# Patient Record
Sex: MALE | Race: WHITE | NOT HISPANIC OR LATINO | Employment: FULL TIME | URBAN - METROPOLITAN AREA
[De-identification: names, ages, dates, MRNs, and addresses within clinical notes are randomized per-mention and may not be internally consistent; named-entity substitution may affect disease eponyms.]

---

## 2024-02-01 ASSESSMENT — SLEEP AND FATIGUE QUESTIONNAIRES
HOW LIKELY ARE YOU TO NOD OFF OR FALL ASLEEP WHILE SITTING QUIETLY AFTER LUNCH WITHOUT ALCOHOL: WOULD NEVER DOZE
HOW LIKELY ARE YOU TO NOD OFF OR FALL ASLEEP WHILE SITTING AND TALKING TO SOMEONE: WOULD NEVER DOZE
HOW LIKELY ARE YOU TO NOD OFF OR FALL ASLEEP WHEN YOU ARE A PASSENGER IN A CAR FOR AN HOUR WITHOUT A BREAK: HIGH CHANCE OF DOZING
HOW LIKELY ARE YOU TO NOD OFF OR FALL ASLEEP WHILE WATCHING TV: SLIGHT CHANCE OF DOZING
HOW LIKELY ARE YOU TO NOD OFF OR FALL ASLEEP WHILE SITTING AND READING: MODERATE CHANCE OF DOZING
HOW LIKELY ARE YOU TO NOD OFF OR FALL ASLEEP WHILE SITTING INACTIVE IN A PUBLIC PLACE: SLIGHT CHANCE OF DOZING
HOW LIKELY ARE YOU TO NOD OFF OR FALL ASLEEP WHILE LYING DOWN TO REST IN THE AFTERNOON WHEN CIRCUMSTANCES PERMIT: HIGH CHANCE OF DOZING
HOW LIKELY ARE YOU TO NOD OFF OR FALL ASLEEP IN A CAR, WHILE STOPPED FOR A FEW MINUTES IN TRAFFIC: WOULD NEVER DOZE

## 2024-02-01 NOTE — PROGRESS NOTES
"    New Medical Weight Management Consult    PATIENT:  Isaias Valencia Jr.  MRN:         [id number]  :         1985  TERESA:         2024    Dear angel,    I had the pleasure of seeing your patient, Isaias Valencia Jr.. Full intake/assessment was done to determine barriers to weight loss success and develop a treatment plan. Isaias Valencia Jr. is a 38 year old male interested in treatment of medical problems associated with excess weight. He has a height of 5' 7.75\", a weight of 307 lbs 14.4 oz, and the calculated Body mass index is 47.16 kg/m .    ASSESSMENT/PLAN:  Class 3 severe obesity due to excess calories without serious comorbidity with body mass index (BMI) of 45.0 to 49.9 in adult (H)  38 year old year old male in clinic today to discuss treatment of the following conditions through diet and lifestyle modification and weight loss:  1. Class 3 severe obesity due to excess calories without serious comorbidity with body mass index (BMI) of 45.0 to 49.9 in adult (H)    2. Dyslipidemia    3. Depression with anxiety    4. Elevated fasting glucose      Intake: 38-year-old man with severe obesity who meets criteria for metabolic syndrome presenting for discussion of overall metabolic health.  We do lengthy discussion regarding metabolic syndrome and how this predisposes him to fat and easily.  He has a job that [place] which is a barrier to successful weight loss.  He is busy both professionally and with his family.  I recommended a high-protein and high vegetable diet.  He asked a lot of questions about fasting/intermittent fasting.  I do not think this is unreasonable but I did caution him to ensure that he is making/consuming adequate protein.  There is no contraindication to a GLP-1 receptor agonist.  We discussed potential benefits and drawbacks of various options.  Zepbound was prescribed.  No family/personal history of thyroid cancer.  No " "personal history of pancreatitis.  He does not have symptoms suggestive of binge eating disorder.  He has a history of anxiety making anorexia genic medication such as phentermine and phentermine/topiramate (or diethylpropion) relatively contraindicated.  He has been Wellbutrin for mental health in the past without any benefit either for appetite or for mood.  He is working with a dietitian and a different health system.  He will continue to do so.  Of asked him to return to clinic approximately 8-10 weeks after starting a new medication.  Will titrate tirzepatide to at least 10 mg and likely 15 mg.    FOLLOW-UP:   8 weeks.    SUBJECTIVE:     He has the following co-morbidities:        2/1/2024     8:50 PM   --   I have the following health issues associated with obesity None of the above   I have the following symptoms associated with obesity Depression    Back Pain    Fatigue     Narrative History:    - in the past he lost weight through counting calories.   - frustrated that he has not lost jennifer in the past 30 days.  Lots of water.  <2000 calories.     - energy: \"not great.\"    - sleep: varies.  More on the weekends. Less during the week.     - kids play hockey.   - work is busy.  Does not eat during the day.  [place]  As a result he struggles with nutrition.  Some overnight travel.     - he did not grow up eating veggies. He has added peas and carrots.      - no alcohol.   - some flavored water.    - history of anxiety. He was on a number of medications including wellbutrin.  Some persistant depression and anxiety symptoms.  - goal: hunting, fishing and kayaking.          No data to display                  2/1/2024     8:50 PM   Referring Provider   Please name the provider who referred you to Medical Weight Management  If you do not know, please answer \"I Don't Know\" No one         2/1/2024     8:50 PM   Weight History   How concerned are you about your weight? " Very Concerned   I became overweight As a Teenager   The following factors have contributed to my weight gain Mental Health Issues    Started on Medication that Caused Weight Gain    Eating Too Much    Lack of Exercise    Stress   I have tried the following methods to lose weight Watching Portions or Calories    Exercise    Atkins-type Diet (Low Carb/High Protein)    Fasting   My lowest weight since age 16 was 200   My highest weight since age 16 was 310   The most weight I have ever lost was (lbs) 65   I have the following family history of obesity/being overweight My mother is overweight    One or more of my siblings are overweight    Many of my relatives are overweight   How has your weight changed over the last year? Lost   How many pounds? 1         2/1/2024     8:50 PM   Diet Recall Review with Patient   If you do eat lunch, what types of food do you typically eat? Whatever kwik trip has   If you do eat supper, what types of food do you typically eat? Meat vegtable and potato   If you do snack, what types of food do you typically eat? Chips   How many glasses of juice do you drink in a typical day? 0   How many of glasses of milk do you drink in a typical day? 1   If you do drink milk, what type? 2%   How many 8oz glasses of sugar containing drinks such as Ck-Aid/sweet tea do you drink in a day? 0   How many cans/bottles of sugar pop/soda/tea/sports drinks do you drink in a day? 0   How many cans/bottles of diet pop/soda/tea or sports drink do you drink in a day? 3   How often do you have a drink of alcohol? Never         2/1/2024     8:50 PM   Eating Habits   Generally, my meals include foods like these bread, pasta, rice, potatoes, corn, crackers, sweet dessert, pop, or juice Almost Everyday   Generally, my meals include foods like these fried meats, brats, burgers, french fries, pizza, cheese, chips, or ice cream A Few Times a Week   Eat fast food (like [employer], [employer], [employer]) A Few Times a  Week   Eat at a buffet or sit-down restaurant Less Than Weekly   Eat most of my meals in front of the TV or computer A Few Times a Week   Often skip meals, eat at random times, have no regular eating times Almost Everyday   Rarely sit down for a meal but snack or graze throughout A Few Times a Week   Eat extra snacks between meals A Few Times a Week   Eat most of my food at the end of the day Almost Everyday   Eat in the middle of the night or wake up at night to eat A Few Times a Week   Eat extra snacks to prevent or correct low blood sugar Never   Eat to prevent acid reflux or stomach pain Never   Worry about not having enough food to eat Never   I eat when I am depressed A Few Times a Week   I eat when I am stressed A Few Times a Week   I eat when I am bored A Few Times a Week   I eat when I am anxious A Few Times a Week   I eat when I am happy or as a reward Less Than Weekly   I feel hungry all the time even if I just have eaten Everyday   Feeling full is important to me Almost Everyday   I finish all the food on my plate even if I am already full Almost Everyday   I can't resist eating delicious food or walk past the good food/smell Almost Everyday   I eat/snack without noticing that I am eating Almost Everyday   I eat when I am preparing the meal A Few Times a Week   I eat more than usual when I see others eating A Few Times a Week   I have trouble not eating sweets, ice cream, cookies, or chips if they are around the house Everyday   I think about food all day Everyday   What foods, if any, do you crave? Chips/Crackers   Please list any other foods you crave? Salty foods abd greasy foods         2/1/2024     8:50 PM   Amount of Food   I feel out of control when eating Almost Everyday   I eat a large amount of food, like a loaf of bread, a box of cookies, a pint/quart of ice cream, all at once Weekly   I eat a large amount of food even when I am not hungry Weekly   I eat rapidly Everyday   I eat alone because  I feel embarrassed and do not want others to see how much I have eaten Almost Everyday   I eat until I am uncomfortably full Weekly   I feel bad, disgusted, or guilty after I overeat Everyday         2/1/2024     8:50 PM   Activity/Exercise History   How much of a typical 12 hour day do you spend sitting? Half the Day   How much of a typical 12 hour day do you spend lying down? Less Than Half the Day   How much of a typical day do you spend walking/standing? Half the Day   How many hours (not including work) do you spend on the TV/Video Games/Computer/Tablet/Phone? 2-3 Hours   How many times a week are you active for the purpose of exercise? Once a Week   What keeps you from being more active? Too tired   How many total minutes do you spend doing some activity for the purpose of exercising when you exercise? 15-30 Minutes     PAST MEDICAL HISTORY:  Past Medical History:   Diagnosis Date    Depressive disorder          2/1/2024     8:50 PM   Work/Social History Reviewed With Patient   My employment status is Full-Time   My job is Service tech   How much of your job is spent on the computer or phone? Less Than 50%   How many hours do you spend commuting to work daily? 4   What is your marital status? Single   If you have children, are they overweight? Yes   Who do you live with? Myself and kids   Who does the food shopping? Me         2/1/2024     8:50 PM   Mental Health History Reviewed With Patient   Have you ever been physically or sexually abused? No   How often in the past 2 weeks have you felt little interest or pleasure in doing things? For Several Days   Over the past 2 weeks how often have you felt down, depressed, or hopeless? For Several Days         2/1/2024     8:50 PM   Sleep History Reviewed With Patient   How many hours do you sleep at night? 7     MEDICATIONS:   Current Outpatient Medications   Medication Sig Dispense Refill    tirzepatide-Weight Management (ZEPBOUND) 2.5 MG/0.5ML prefilled pen Inject  "0.5 mLs (2.5 mg) Subcutaneous every 7 days (Send message to clinic after first 2 doses.  We will send the \"next step\" in the titration schedule.) 2 mL 0     ALLERGIES:   No Known Allergies  PHYSICAL EXAM:  Physical Exam  Nursing note reviewed.   Constitutional:       General: He is not in acute distress.     Appearance: Normal appearance. He is not ill-appearing.   HENT:      Head: Normocephalic and atraumatic.   Eyes:      Extraocular Movements: Extraocular movements intact.      Conjunctiva/sclera: Conjunctivae normal.   Pulmonary:      Effort: Pulmonary effort is normal.   Neurological:      Mental Status: He is alert and oriented to person, place, and time.   Psychiatric:         Attention and Perception: Attention normal.         Mood and Affect: Mood normal.         Speech: Speech normal.         Thought Content: Thought content normal.       63 minutes spent on the date of the encounter doing chart review, history and exam, documentation and further activities per the note    This note has been dictated using voice recognition software. Any grammatical or context distortions are unintentional and inherent to the software    Sincerely,    Mateus Llamas MD  Diplomate - American Board of Obesity Medicine  Diplomate - American Board of Family Medicine  [facility] - Comprehensive Weight Management      "

## 2024-02-02 ENCOUNTER — OFFICE VISIT (OUTPATIENT)
Dept: FAMILY MEDICINE | Facility: CLINIC | Age: 39
End: 2024-02-02

## 2024-02-02 VITALS
SYSTOLIC BLOOD PRESSURE: 121 MMHG | RESPIRATION RATE: 16 BRPM | DIASTOLIC BLOOD PRESSURE: 82 MMHG | TEMPERATURE: 98.3 F | HEART RATE: 64 BPM | WEIGHT: 307.9 LBS | OXYGEN SATURATION: 97 % | HEIGHT: 68 IN | BODY MASS INDEX: 46.66 KG/M2

## 2024-02-02 DIAGNOSIS — E66.813 CLASS 3 SEVERE OBESITY DUE TO EXCESS CALORIES WITHOUT SERIOUS COMORBIDITY WITH BODY MASS INDEX (BMI) OF 45.0 TO 49.9 IN ADULT (H): Primary | ICD-10-CM

## 2024-02-02 DIAGNOSIS — F41.8 DEPRESSION WITH ANXIETY: ICD-10-CM

## 2024-02-02 DIAGNOSIS — E66.01 CLASS 3 SEVERE OBESITY DUE TO EXCESS CALORIES WITHOUT SERIOUS COMORBIDITY WITH BODY MASS INDEX (BMI) OF 45.0 TO 49.9 IN ADULT (H): Primary | ICD-10-CM

## 2024-02-02 DIAGNOSIS — E78.5 DYSLIPIDEMIA: ICD-10-CM

## 2024-02-02 DIAGNOSIS — R73.01 ELEVATED FASTING GLUCOSE: ICD-10-CM

## 2024-02-02 PROCEDURE — 99205 OFFICE O/P NEW HI 60 MIN: CPT | Performed by: FAMILY MEDICINE

## 2024-02-02 NOTE — ASSESSMENT & PLAN NOTE
38 year old year old male in clinic today to discuss treatment of the following conditions through diet and lifestyle modification and weight loss:  1. Class 3 severe obesity due to excess calories without serious comorbidity with body mass index (BMI) of 45.0 to 49.9 in adult (H)    2. Dyslipidemia    3. Depression with anxiety    4. Elevated fasting glucose      Intake: 38-year-old man with severe obesity who meets criteria for metabolic syndrome presenting for discussion of overall metabolic health.  We do lengthy discussion regarding metabolic syndrome and how this predisposes him to fat and easily.  He has a job that [place] which is a barrier to successful weight loss.  He is busy both professionally and with his family.  I recommended a high-protein and high vegetable diet.  He asked a lot of questions about fasting/intermittent fasting.  I do not think this is unreasonable but I did caution him to ensure that he is making/consuming adequate protein.  There is no contraindication to a GLP-1 receptor agonist.  We discussed potential benefits and drawbacks of various options.  Zepbound was prescribed.  No family/personal history of thyroid cancer.  No personal history of pancreatitis.  He does not have symptoms suggestive of binge eating disorder.  He has a history of anxiety making anorexia genic medication such as phentermine and phentermine/topiramate (or diethylpropion) relatively contraindicated.  He has been Wellbutrin for mental health in the past without any benefit either for appetite or for mood.  He is working with a dietitian and a different health system.  He will continue to do so.  Of asked him to return to clinic approximately 8-10 weeks after starting a new medication.  Will titrate tirzepatide to at least 10 mg and likely 15 mg.

## 2024-02-13 ENCOUNTER — MYC MEDICAL ADVICE (OUTPATIENT)
Dept: FAMILY MEDICINE | Facility: CLINIC | Age: 39
End: 2024-02-13

## 2024-02-13 DIAGNOSIS — E66.01 CLASS 3 SEVERE OBESITY DUE TO EXCESS CALORIES WITHOUT SERIOUS COMORBIDITY WITH BODY MASS INDEX (BMI) OF 45.0 TO 49.9 IN ADULT (H): Primary | ICD-10-CM

## 2024-02-13 DIAGNOSIS — R73.01 ELEVATED FASTING GLUCOSE: ICD-10-CM

## 2024-02-13 DIAGNOSIS — E66.813 CLASS 3 SEVERE OBESITY DUE TO EXCESS CALORIES WITHOUT SERIOUS COMORBIDITY WITH BODY MASS INDEX (BMI) OF 45.0 TO 49.9 IN ADULT (H): Primary | ICD-10-CM

## 2024-02-13 DIAGNOSIS — E78.5 DYSLIPIDEMIA: ICD-10-CM

## 2024-03-03 ENCOUNTER — HEALTH MAINTENANCE LETTER (OUTPATIENT)
Age: 39
End: 2024-03-03

## 2024-03-22 ENCOUNTER — MYC MEDICAL ADVICE (OUTPATIENT)
Dept: FAMILY MEDICINE | Facility: CLINIC | Age: 39
End: 2024-03-22

## 2024-03-22 DIAGNOSIS — E66.01 CLASS 3 SEVERE OBESITY DUE TO EXCESS CALORIES WITHOUT SERIOUS COMORBIDITY WITH BODY MASS INDEX (BMI) OF 45.0 TO 49.9 IN ADULT (H): Primary | ICD-10-CM

## 2024-03-22 DIAGNOSIS — E78.5 DYSLIPIDEMIA: ICD-10-CM

## 2024-03-22 DIAGNOSIS — E66.813 CLASS 3 SEVERE OBESITY DUE TO EXCESS CALORIES WITHOUT SERIOUS COMORBIDITY WITH BODY MASS INDEX (BMI) OF 45.0 TO 49.9 IN ADULT (H): Primary | ICD-10-CM

## 2024-03-22 DIAGNOSIS — R73.01 ELEVATED FASTING GLUCOSE: ICD-10-CM

## 2024-04-02 ENCOUNTER — OFFICE VISIT (OUTPATIENT)
Dept: FAMILY MEDICINE | Facility: CLINIC | Age: 39
End: 2024-04-02
Attending: FAMILY MEDICINE
Payer: COMMERCIAL

## 2024-04-02 VITALS
HEIGHT: 68 IN | DIASTOLIC BLOOD PRESSURE: 84 MMHG | OXYGEN SATURATION: 97 % | TEMPERATURE: 97.8 F | RESPIRATION RATE: 16 BRPM | HEART RATE: 74 BPM | WEIGHT: 275.3 LBS | SYSTOLIC BLOOD PRESSURE: 108 MMHG | BODY MASS INDEX: 41.72 KG/M2

## 2024-04-02 DIAGNOSIS — E66.01 CLASS 3 SEVERE OBESITY DUE TO EXCESS CALORIES WITHOUT SERIOUS COMORBIDITY WITH BODY MASS INDEX (BMI) OF 40.0 TO 44.9 IN ADULT (H): ICD-10-CM

## 2024-04-02 DIAGNOSIS — R73.01 ELEVATED FASTING GLUCOSE: ICD-10-CM

## 2024-04-02 DIAGNOSIS — E66.813 CLASS 3 SEVERE OBESITY DUE TO EXCESS CALORIES WITHOUT SERIOUS COMORBIDITY WITH BODY MASS INDEX (BMI) OF 40.0 TO 44.9 IN ADULT (H): ICD-10-CM

## 2024-04-02 DIAGNOSIS — E78.5 DYSLIPIDEMIA: ICD-10-CM

## 2024-04-02 PROCEDURE — 99213 OFFICE O/P EST LOW 20 MIN: CPT | Performed by: FAMILY MEDICINE

## 2024-04-02 NOTE — PROGRESS NOTES
"  Assessment & Plan   Problem List Items Addressed This Visit       Class 3 severe obesity due to excess calories without serious comorbidity with body mass index (BMI) of 40.0 to 44.9 in adult (H)     38 year old year old male in clinic today to discuss treatment of the following conditions through diet and lifestyle modification and weight loss:  1. Class 3 severe obesity due to excess calories without serious comorbidity with body mass index (BMI) of 40.0 to 44.9 in adult (H)    2. Dyslipidemia    3. Elevated fasting glucose    The patient's weight loss result since the last visit was successful based on weight loss.  Cravings greatly reduced.  Doing well.  Discussed movement.   His insurance will change and he will have a copay increase.  He will need a PA.  The patient was told that he will get approved.      BMI: Body mass index is 42.17 kg/m .  Ideal body weight: 67.8 kg (149 lb 8.4 oz)  Adjusted ideal body weight: 90.6 kg (199 lb 13.4 oz)    Current therapies:    Medications: YES - Zepbound   - Starting weight for GLP1 receptor agonist: 307 lb   - Total weight loss: 32 lbs (10.4%)    Nutritional goals/strategies: reviewed.   - caloric restriction: Yes   - time restriction: NO   - diet (macronutrient) framework: high protein/low carbohydrate    Movement:   - predominantly cardiovascular    Follow-up: 3 months           Relevant Medications    tirzepatide-Weight Management (ZEPBOUND) 12.5 MG/0.5ML prefilled pen    Dyslipidemia    Relevant Medications    tirzepatide-Weight Management (ZEPBOUND) 12.5 MG/0.5ML prefilled pen     Other Visit Diagnoses       Elevated fasting glucose        Relevant Medications    tirzepatide-Weight Management (ZEPBOUND) 12.5 MG/0.5ML prefilled pen             BMI  Estimated body mass index is 42.17 kg/m  as calculated from the following:    Height as of this encounter: 1.721 m (5' 7.75\").    Weight as of this encounter: 124.9 kg (275 lb 4.8 oz).   Weight management plan: Specific " "weight management program called Comprehensive Weight Management discussed    Subjective   Isaias is a 38 year old, presenting for the following health issues:  Weight Loss (Follow-up/)        4/2/2024     8:02 AM   Additional Questions   Roomed by XL     Patient presents for treatment of chronic, comorbid conditions using intensive therapeutic lifestyle interventions including nutrition, physical activity, and behavior management.   - successes: in general he has been eating better.  No fast food or cravings.  Dramatic reduction.  Inspired by \"seeing results.\"   - struggles: \"getting enough of foods.\"     - movement: 7k steps during the week.  Drives for work.  More active on weekend.     - tracking/journaling: ad luis manuel but trying to get 100+ grams of protein.     - following nutritional plan: yes.  Deviations from plan: cheesy potatoes at [facility].  Rare pizza.    - hunger: Improved.   - medication benefits: helpful. side effects: none.  Hunger day after his injection.    History of Present Illness       Reason for visit:  Flow ip weighy loss    He eats 4 or more servings of fruits and vegetables daily.He consumes 0 sweetened beverage(s) daily.He exercises with enough effort to increase his heart rate 20 to 29 minutes per day.  He exercises with enough effort to increase his heart rate 4 days per week.   He is taking medications regularly.        Objective    /84 (BP Location: Left arm, Patient Position: Sitting, Cuff Size: Adult Large)   Pulse 74   Temp 97.8  F (36.6  C) (Oral)   Resp 16   Ht 1.721 m (5' 7.75\")   Wt 124.9 kg (275 lb 4.8 oz)   SpO2 97%   BMI 42.17 kg/m    Body mass index is 42.17 kg/m .  Physical Exam  Nursing note reviewed.   Constitutional:       General: He is not in acute distress.     Appearance: Normal appearance. He is not ill-appearing.   HENT:      Head: Normocephalic and atraumatic.   Eyes:      Extraocular Movements: Extraocular movements intact.      Conjunctiva/sclera: " Conjunctivae normal.   Pulmonary:      Effort: Pulmonary effort is normal.   Neurological:      Mental Status: He is alert and oriented to person, place, and time.   Psychiatric:         Attention and Perception: Attention normal.         Mood and Affect: Mood normal.         Speech: Speech normal.         Thought Content: Thought content normal.              Signed Electronically by: Mateus Llamas MD

## 2024-04-02 NOTE — ASSESSMENT & PLAN NOTE
38 year old year old male in clinic today to discuss treatment of the following conditions through diet and lifestyle modification and weight loss:  1. Class 3 severe obesity due to excess calories without serious comorbidity with body mass index (BMI) of 40.0 to 44.9 in adult (H)    2. Dyslipidemia    3. Elevated fasting glucose      The patient's weight loss result since the last visit was successful based on weight loss.  Cravings greatly reduced.  Doing well.  Discussed movement.   His insurance will change and he will have a copay increase.  He will need a PA.  The patient was told that he will get approved.      BMI: Body mass index is 42.17 kg/m .  Ideal body weight: 67.8 kg (149 lb 8.4 oz)  Adjusted ideal body weight: 90.6 kg (199 lb 13.4 oz)    Current therapies:    Medications: YES - Zepbound   - Starting weight for GLP1 receptor agonist: 307 lb   - Total weight loss: 32 lbs (10.4%)    Nutritional goals/strategies: reviewed.   - caloric restriction: Yes   - time restriction: NO   - diet (macronutrient) framework: high protein/low carbohydrate    Movement:   - predominantly cardiovascular    Follow-up: 3 months

## 2024-05-10 ENCOUNTER — MYC MEDICAL ADVICE (OUTPATIENT)
Dept: FAMILY MEDICINE | Facility: CLINIC | Age: 39
End: 2024-05-10
Payer: COMMERCIAL

## 2024-05-10 DIAGNOSIS — E66.01 CLASS 3 SEVERE OBESITY DUE TO EXCESS CALORIES WITHOUT SERIOUS COMORBIDITY WITH BODY MASS INDEX (BMI) OF 40.0 TO 44.9 IN ADULT (H): Primary | ICD-10-CM

## 2024-05-10 DIAGNOSIS — E66.813 CLASS 3 SEVERE OBESITY DUE TO EXCESS CALORIES WITHOUT SERIOUS COMORBIDITY WITH BODY MASS INDEX (BMI) OF 40.0 TO 44.9 IN ADULT (H): Primary | ICD-10-CM

## 2024-05-10 DIAGNOSIS — R73.01 ELEVATED FASTING GLUCOSE: ICD-10-CM

## 2024-05-10 DIAGNOSIS — E78.5 DYSLIPIDEMIA: ICD-10-CM

## 2024-07-05 ENCOUNTER — OFFICE VISIT (OUTPATIENT)
Dept: FAMILY MEDICINE | Facility: CLINIC | Age: 39
End: 2024-07-05
Attending: FAMILY MEDICINE
Payer: COMMERCIAL

## 2024-07-05 VITALS
DIASTOLIC BLOOD PRESSURE: 78 MMHG | HEART RATE: 103 BPM | TEMPERATURE: 98.5 F | HEIGHT: 68 IN | BODY MASS INDEX: 36.07 KG/M2 | WEIGHT: 238 LBS | RESPIRATION RATE: 16 BRPM | OXYGEN SATURATION: 100 % | SYSTOLIC BLOOD PRESSURE: 117 MMHG

## 2024-07-05 DIAGNOSIS — E78.5 DYSLIPIDEMIA: ICD-10-CM

## 2024-07-05 DIAGNOSIS — R73.01 ELEVATED FASTING GLUCOSE: ICD-10-CM

## 2024-07-05 DIAGNOSIS — E66.812 CLASS 2 OBESITY DUE TO EXCESS CALORIES WITHOUT SERIOUS COMORBIDITY WITH BODY MASS INDEX (BMI) OF 36.0 TO 36.9 IN ADULT: Primary | ICD-10-CM

## 2024-07-05 DIAGNOSIS — E66.09 CLASS 2 OBESITY DUE TO EXCESS CALORIES WITHOUT SERIOUS COMORBIDITY WITH BODY MASS INDEX (BMI) OF 36.0 TO 36.9 IN ADULT: Primary | ICD-10-CM

## 2024-07-05 PROCEDURE — G2211 COMPLEX E/M VISIT ADD ON: HCPCS | Performed by: FAMILY MEDICINE

## 2024-07-05 PROCEDURE — 99214 OFFICE O/P EST MOD 30 MIN: CPT | Performed by: FAMILY MEDICINE

## 2024-07-05 NOTE — ASSESSMENT & PLAN NOTE
38 year old year old male in clinic today to discuss treatment of the following conditions through diet and lifestyle modification and weight loss:  1. Class 2 obesity due to excess calories without serious comorbidity with body mass index (BMI) of 36.0 to 36.9 in adult    2. Dyslipidemia    3. Elevated fasting glucose      The patient's weight loss result since the last visit was successful based on weight loss, increased energy and increased physical activity.  He is eating well.  He is active. Less weight loss recently.  He is trying to figure out nutrition while traveling.      BMI: Body mass index is 36.46 kg/m .  Ideal body weight: 67.8 kg (149 lb 8.4 oz)  Adjusted ideal body weight: 83.9 kg (184 lb 14.7 oz)    Current therapies:    Medications: YES Zepbound   - Starting weight for GLP1 receptor agonist: 307   - Total weight loss: 67 lbs (22.4%)    Nutritional goals/strategies: reviewed.   - caloric restriction: Yes   - time restriction: YES   - diet (macronutrient) framework: high protein/low carbohydrate    Movement:   - predominantly cardiovascular

## 2024-07-05 NOTE — PROGRESS NOTES
Assessment & Plan   Problem List Items Addressed This Visit       Class 2 obesity due to excess calories without serious comorbidity with body mass index (BMI) of 36.0 to 36.9 in adult - Primary     38 year old year old male in clinic today to discuss treatment of the following conditions through diet and lifestyle modification and weight loss:  1. Class 2 obesity due to excess calories without serious comorbidity with body mass index (BMI) of 36.0 to 36.9 in adult    2. Dyslipidemia    3. Elevated fasting glucose      The patient's weight loss result since the last visit was successful based on weight loss, increased energy and increased physical activity.  He is eating well.  He is active. Less weight loss recently.  He is trying to figure out nutrition while traveling.      BMI: Body mass index is 36.46 kg/m .  Ideal body weight: 67.8 kg (149 lb 8.4 oz)  Adjusted ideal body weight: 83.9 kg (184 lb 14.7 oz)    Current therapies:    Medications: YES Zepbound   - Starting weight for GLP1 receptor agonist: 307   - Total weight loss: 67 lbs (22.4%)    Nutritional goals/strategies: reviewed.   - caloric restriction: Yes   - time restriction: YES   - diet (macronutrient) framework: high protein/low carbohydrate    Movement:   - predominantly cardiovascular             Relevant Medications    tirzepatide-Weight Management (ZEPBOUND) 15 MG/0.5ML prefilled pen    Dyslipidemia    Relevant Medications    tirzepatide-Weight Management (ZEPBOUND) 15 MG/0.5ML prefilled pen     Other Visit Diagnoses       Elevated fasting glucose        Relevant Medications    tirzepatide-Weight Management (ZEPBOUND) 15 MG/0.5ML prefilled pen           The longitudinal plan of care for the diagnosis(es)/condition(s) as documented were addressed during this visit. Due to the added complexity in care, I will continue to support Isaias in the subsequent management and with ongoing continuity of care.      Laron Esparza is a 38 year old,  "presenting for the following health issues:  Weight Loss (Follow-up/)        7/5/2024    10:28 AM   Additional Questions   Roomed by xl     Patient presents for treatment of chronic, comorbid conditions using intensive therapeutic lifestyle interventions including nutrition, physical activity, and behavior management.   - successes: overall, he feels like he is doing well.  Goal is 200 lbs.  No food after 9pm.  Rare after 7:30pm.      - struggles: weight stagnant.  Has to work to get enough water.   - movement: moving more.  Eating well.  Energy great.  Walking 3 miles 3-4x/week. He was able to do a 7mile hike.  He has been weight lighting 2x/week.  Goal: more lifting. 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     - tracking/journaling: ad luis manuel.  Some ASHLYN.  No fast food.  Some protein supplementation   - following nutritional plan: yes.  Deviations from plan: rare   - hunger: Stable.    - medication benefits: helpful side effects: intolerance to some foods.  No other side effects.    History of Present Illness       Reason for visit:  Weight loss    He eats 2-3 servings of fruits and vegetables daily.He consumes 0 sweetened beverage(s) daily.He exercises with enough effort to increase his heart rate 30 to 60 minutes per day.  He exercises with enough effort to increase his heart rate 4 days per week.   He is taking medications regularly.        Objective    /78 (BP Location: Left arm, Patient Position: Sitting, Cuff Size: Adult Large)   Pulse 103   Temp 98.5  F (36.9  C) (Oral)   Resp 16   Ht 1.721 m (5' 7.75\")   Wt 108 kg (238 lb)   SpO2 100%   BMI 36.46 kg/m    Body mass index is 36.46 kg/m .  Physical Exam  Nursing note reviewed.   Constitutional:       General: He is not in acute distress.     Appearance: Normal appearance. He is not ill-appearing.   HENT:      Head: Normocephalic and atraumatic.   Eyes:      Extraocular Movements: Extraocular movements intact.      Conjunctiva/sclera: Conjunctivae normal. "   Pulmonary:      Effort: Pulmonary effort is normal.   Neurological:      Mental Status: He is alert and oriented to person, place, and time.   Psychiatric:         Attention and Perception: Attention normal.         Mood and Affect: Mood normal.         Speech: Speech normal.         Thought Content: Thought content normal.           Signed Electronically by: Mateus Llamas MD

## 2024-08-22 ENCOUNTER — APPOINTMENT (OUTPATIENT)
Dept: MRI IMAGING | Facility: CLINIC | Age: 39
End: 2024-08-22
Attending: EMERGENCY MEDICINE
Payer: COMMERCIAL

## 2024-08-22 ENCOUNTER — HOSPITAL ENCOUNTER (EMERGENCY)
Facility: CLINIC | Age: 39
Discharge: HOME | End: 2024-08-22
Attending: EMERGENCY MEDICINE | Admitting: EMERGENCY MEDICINE
Payer: COMMERCIAL

## 2024-08-22 ENCOUNTER — APPOINTMENT (OUTPATIENT)
Dept: ULTRASOUND IMAGING | Facility: CLINIC | Age: 39
End: 2024-08-22
Attending: EMERGENCY MEDICINE
Payer: COMMERCIAL

## 2024-08-22 VITALS
BODY MASS INDEX: 32.58 KG/M2 | TEMPERATURE: 98 F | OXYGEN SATURATION: 100 % | WEIGHT: 220 LBS | DIASTOLIC BLOOD PRESSURE: 79 MMHG | SYSTOLIC BLOOD PRESSURE: 132 MMHG | RESPIRATION RATE: 18 BRPM | HEIGHT: 69 IN | HEART RATE: 71 BPM

## 2024-08-22 DIAGNOSIS — R10.13 EPIGASTRIC PAIN: ICD-10-CM

## 2024-08-22 DIAGNOSIS — R79.89 ELEVATED LIVER FUNCTION TESTS: ICD-10-CM

## 2024-08-22 LAB
ALBUMIN SERPL BCG-MCNC: 4.2 G/DL (ref 3.5–5.2)
ALP SERPL-CCNC: 149 U/L (ref 40–150)
ALT SERPL W P-5'-P-CCNC: 175 U/L (ref 0–70)
ANION GAP SERPL CALCULATED.3IONS-SCNC: 14 MMOL/L (ref 7–15)
AST SERPL W P-5'-P-CCNC: 270 U/L (ref 0–45)
BASOPHILS # BLD AUTO: 0 10E3/UL (ref 0–0.2)
BASOPHILS NFR BLD AUTO: 0 %
BILIRUB SERPL-MCNC: 1.9 MG/DL
BUN SERPL-MCNC: 19.1 MG/DL (ref 6–20)
CALCIUM SERPL-MCNC: 9.4 MG/DL (ref 8.8–10.4)
CHLORIDE SERPL-SCNC: 103 MMOL/L (ref 98–107)
CREAT SERPL-MCNC: 1.16 MG/DL (ref 0.67–1.17)
EGFRCR SERPLBLD CKD-EPI 2021: 82 ML/MIN/1.73M2
EOSINOPHIL # BLD AUTO: 0.1 10E3/UL (ref 0–0.7)
EOSINOPHIL NFR BLD AUTO: 1 %
ERYTHROCYTE [DISTWIDTH] IN BLOOD BY AUTOMATED COUNT: 13.1 % (ref 10–15)
GLUCOSE SERPL-MCNC: 129 MG/DL (ref 70–99)
HAV IGM SERPL QL IA: NONREACTIVE
HBV CORE IGM SERPL QL IA: NONREACTIVE
HBV SURFACE AG SERPL QL IA: NONREACTIVE
HCO3 SERPL-SCNC: 24 MMOL/L (ref 22–29)
HCT VFR BLD AUTO: 48 % (ref 40–53)
HCV AB SERPL QL IA: NONREACTIVE
HGB BLD-MCNC: 16.8 G/DL (ref 13.3–17.7)
IMM GRANULOCYTES # BLD: 0 10E3/UL
IMM GRANULOCYTES NFR BLD: 0 %
LIPASE SERPL-CCNC: 47 U/L (ref 13–60)
LYMPHOCYTES # BLD AUTO: 1.5 10E3/UL (ref 0.8–5.3)
LYMPHOCYTES NFR BLD AUTO: 18 %
MCH RBC QN AUTO: 30.7 PG (ref 26.5–33)
MCHC RBC AUTO-ENTMCNC: 35 G/DL (ref 31.5–36.5)
MCV RBC AUTO: 88 FL (ref 78–100)
MONOCYTES # BLD AUTO: 0.6 10E3/UL (ref 0–1.3)
MONOCYTES NFR BLD AUTO: 7 %
NEUTROPHILS # BLD AUTO: 6.3 10E3/UL (ref 1.6–8.3)
NEUTROPHILS NFR BLD AUTO: 74 %
NRBC # BLD AUTO: 0 10E3/UL
NRBC BLD AUTO-RTO: 0 /100
PLATELET # BLD AUTO: 308 10E3/UL (ref 150–450)
POTASSIUM SERPL-SCNC: 3.7 MMOL/L (ref 3.4–5.3)
PROT SERPL-MCNC: 7.5 G/DL (ref 6.4–8.3)
RBC # BLD AUTO: 5.48 10E6/UL (ref 4.4–5.9)
SODIUM SERPL-SCNC: 141 MMOL/L (ref 135–145)
WBC # BLD AUTO: 8.5 10E3/UL (ref 4–11)

## 2024-08-22 PROCEDURE — 36415 COLL VENOUS BLD VENIPUNCTURE: CPT | Performed by: EMERGENCY MEDICINE

## 2024-08-22 PROCEDURE — 99285 EMERGENCY DEPT VISIT HI MDM: CPT | Mod: 25

## 2024-08-22 PROCEDURE — 80053 COMPREHEN METABOLIC PANEL: CPT | Performed by: EMERGENCY MEDICINE

## 2024-08-22 PROCEDURE — 83690 ASSAY OF LIPASE: CPT | Performed by: EMERGENCY MEDICINE

## 2024-08-22 PROCEDURE — 74183 MRI ABD W/O CNTR FLWD CNTR: CPT

## 2024-08-22 PROCEDURE — 85025 COMPLETE CBC W/AUTO DIFF WBC: CPT | Performed by: EMERGENCY MEDICINE

## 2024-08-22 PROCEDURE — A9585 GADOBUTROL INJECTION: HCPCS | Performed by: EMERGENCY MEDICINE

## 2024-08-22 PROCEDURE — 96374 THER/PROPH/DIAG INJ IV PUSH: CPT | Mod: 59

## 2024-08-22 PROCEDURE — 80074 ACUTE HEPATITIS PANEL: CPT | Performed by: EMERGENCY MEDICINE

## 2024-08-22 PROCEDURE — 76705 ECHO EXAM OF ABDOMEN: CPT

## 2024-08-22 PROCEDURE — 96361 HYDRATE IV INFUSION ADD-ON: CPT

## 2024-08-22 PROCEDURE — 96375 TX/PRO/DX INJ NEW DRUG ADDON: CPT

## 2024-08-22 PROCEDURE — 255N000002 HC RX 255 OP 636: Performed by: EMERGENCY MEDICINE

## 2024-08-22 PROCEDURE — 250N000011 HC RX IP 250 OP 636: Performed by: EMERGENCY MEDICINE

## 2024-08-22 PROCEDURE — 258N000003 HC RX IP 258 OP 636: Performed by: EMERGENCY MEDICINE

## 2024-08-22 RX ORDER — OMEPRAZOLE 40 MG/1
40 CAPSULE, DELAYED RELEASE ORAL DAILY
Qty: 30 CAPSULE | Refills: 0 | Status: SHIPPED | OUTPATIENT
Start: 2024-08-22 | End: 2024-08-28

## 2024-08-22 RX ORDER — KETOROLAC TROMETHAMINE 15 MG/ML
15 INJECTION, SOLUTION INTRAMUSCULAR; INTRAVENOUS ONCE
Status: COMPLETED | OUTPATIENT
Start: 2024-08-22 | End: 2024-08-22

## 2024-08-22 RX ORDER — ONDANSETRON 2 MG/ML
4 INJECTION INTRAMUSCULAR; INTRAVENOUS EVERY 30 MIN PRN
Status: DISCONTINUED | OUTPATIENT
Start: 2024-08-22 | End: 2024-08-22 | Stop reason: HOSPADM

## 2024-08-22 RX ORDER — ONDANSETRON 4 MG/1
4 TABLET, ORALLY DISINTEGRATING ORAL EVERY 8 HOURS PRN
Qty: 10 TABLET | Refills: 0 | Status: SHIPPED | OUTPATIENT
Start: 2024-08-22 | End: 2024-08-25

## 2024-08-22 RX ORDER — GADOBUTROL 604.72 MG/ML
10 INJECTION INTRAVENOUS ONCE
Status: COMPLETED | OUTPATIENT
Start: 2024-08-22 | End: 2024-08-22

## 2024-08-22 RX ORDER — GADOBUTROL 604.72 MG/ML
10 INJECTION INTRAVENOUS ONCE
Status: CANCELLED | OUTPATIENT
Start: 2024-08-22 | End: 2024-08-22

## 2024-08-22 RX ADMIN — SODIUM CHLORIDE 1000 ML: 9 INJECTION, SOLUTION INTRAVENOUS at 04:53

## 2024-08-22 RX ADMIN — FAMOTIDINE 20 MG: 10 INJECTION, SOLUTION INTRAVENOUS at 04:53

## 2024-08-22 RX ADMIN — KETOROLAC TROMETHAMINE 15 MG: 15 INJECTION, SOLUTION INTRAMUSCULAR; INTRAVENOUS at 04:52

## 2024-08-22 RX ADMIN — GADOBUTROL 10 ML: 604.72 INJECTION INTRAVENOUS at 10:09

## 2024-08-22 ASSESSMENT — ACTIVITIES OF DAILY LIVING (ADL)
ADLS_ACUITY_SCORE: 35

## 2024-08-22 ASSESSMENT — COLUMBIA-SUICIDE SEVERITY RATING SCALE - C-SSRS
6. HAVE YOU EVER DONE ANYTHING, STARTED TO DO ANYTHING, OR PREPARED TO DO ANYTHING TO END YOUR LIFE?: NO
2. HAVE YOU ACTUALLY HAD ANY THOUGHTS OF KILLING YOURSELF IN THE PAST MONTH?: NO
1. IN THE PAST MONTH, HAVE YOU WISHED YOU WERE DEAD OR WISHED YOU COULD GO TO SLEEP AND NOT WAKE UP?: NO

## 2024-08-22 NOTE — ED TRIAGE NOTES
Pt presents to the ED from home with pain that woke him up. Pt is in the abdomen and radiates to the back. Pt reports having this pain 3 weeks ago and was seen at [facility] in [place].  Pt started taking Zebound  for weight loss in February.      Triage Assessment (Adult)       Row Name 08/22/24 0435          Triage Assessment    Airway WDL WDL        Respiratory WDL    Respiratory WDL WDL        Skin Circulation/Temperature WDL    Skin Circulation/Temperature WDL WDL        Cardiac WDL    Cardiac WDL WDL        Peripheral/Neurovascular WDL    Peripheral Neurovascular WDL WDL        Cognitive/Neuro/Behavioral WDL    Cognitive/Neuro/Behavioral WDL WDL

## 2024-08-22 NOTE — DISCHARGE INSTRUCTIONS
I recommend close follow-up for recheck of your liver function test within 48 to 72 hours.  In addition I recommend follow-up with [facility] in outpatient basis.  I have faxed a referral to their clinic to help get an appointment scheduled please call them today to schedule.  Recommend holding on your Ozempic medication dosages until discussion with your primary care doctor and your weight loss team.  This could be contributing to your current symptomatology and elevated laboratory testing.  If escalation your symptoms or develop additional concern please return to the emergency department for repeat testing.  Please take Prilosec as prescribed and Zofran as needed for nausea

## 2024-08-22 NOTE — ED PROVIDER NOTES
EMERGENCY DEPARTMENT SIGN OUT NOTE        ED COURSE AND MEDICAL DECISION MAKING  Patient was signed out to me by Dr González Mosqueda at 6:49 AM. Currently waiting MRCP for evaluation of abnormal liver tests.     In brief, Isaias Valencia Jr. is a 38 year old male who initially presented with epigastric abdominal pain.  Patient evaluated for this in late July at [facility] with CTA chest abdomen pelvis and laboratory testing that was unremarkable.  Presents to emergency department with persistent pain.  On this workup noted to now have elevation to his LFTs and his bilirubin at 1.9.  Patient is on weight loss drug that he has been on for several weeks.  Ultrasound demonstrating gallbladder sludge.  Persistent discomfort in the emergency department and in light of his elevated bilirubin recommendations to the patient to proceed with MRCP to evaluate for potential CBD stone or other pathology.  Hepatitis panel pending.    7:19 AM  Examined patient.  He was improved compared to earlier in his emergency department visit but still with discomfort.  Discussed with him the plan for MRI imaging.  Will await those test results and reassess patient.    12:30 PM  MRI with no evidence of CBD stone patient has slowly improved over the course of his emergency department stay he is tolerating oral intake and not requiring additional pain management I think is appropriate for discharge home and continued outpatient management.  His hepatitis profile did return while awaiting his MRI results and this was unremarkable.  Patient is scheduled for his next Ozempic dosage tomorrow.  I recommended that he hold on that medication until discussion with his primary care physician and his weight loss team holding on that medication pending further evaluation of his liver function test.  I recommended the patient have a  recheck of those labs on Monday and I also placed a referral faxed over to [facility] for emergent  follow-up.  Reviewed return precautions with patient prior to discharge.  He was instructed to start Prilosec for symptoms and Zofran as needed for nausea he declined any need for pain management.    Addendum: Received request from [facility] nurse line to order the planned repeat blood tests which I added in today.    FINAL IMPRESSION    1. Epigastric pain    2. Elevated liver function tests        ED MEDS  Medications   ondansetron (ZOFRAN) injection 4 mg (has no administration in time range)   sodium chloride 0.9% BOLUS 1,000 mL (0 mLs Intravenous Stopped 8/22/24 8318)   ketorolac (TORADOL) injection 15 mg (15 mg Intravenous $Given 8/22/24 7532)   famotidine (PEPCID) injection 20 mg (20 mg Intravenous $Given 8/22/24 8323)   gadobutrol (GADAVIST) injection 10 mL (10 mLs Intravenous $Given 8/22/24 1000)       LAB  Labs Ordered and Resulted from Time of ED Arrival to Time of ED Departure   COMPREHENSIVE METABOLIC PANEL - Abnormal       Result Value    Sodium 141      Potassium 3.7      Carbon Dioxide (CO2) 24      Anion Gap 14      Urea Nitrogen 19.1      Creatinine 1.16      GFR Estimate 82      Calcium 9.4      Chloride 103      Glucose 129 (*)     Alkaline Phosphatase 149       (*)      (*)     Protein Total 7.5      Albumin 4.2      Bilirubin Total 1.9 (*)    LIPASE - Normal    Lipase 47     ACUTE HEPATITIS PANEL - Normal    Hepatitis A Antibody IgM Nonreactive      Hepatitis B Core Antibody IgM Nonreactive      Hepatitis C Antibody Nonreactive      Hepatitis B Surface Antigen Nonreactive     CBC WITH PLATELETS AND DIFFERENTIAL    WBC Count 8.5      RBC Count 5.48      Hemoglobin 16.8      Hematocrit 48.0      MCV 88      MCH 30.7      MCHC 35.0      RDW 13.1      Platelet Count 308      % Neutrophils 74      % Lymphocytes 18      % Monocytes 7      % Eosinophils 1      % Basophils 0      % Immature Granulocytes 0      NRBCs per 100 WBC 0      Absolute Neutrophils 6.3      Absolute Lymphocytes 1.5       Absolute Monocytes 0.6      Absolute Eosinophils 0.1      Absolute Basophils 0.0      Absolute Immature Granulocytes 0.0      Absolute NRBCs 0.0             RADIOLOGY    MR Abdomen MRCP w/o & w Contrast   Final Result   IMPRESSION:   1.  No findings to explain the patient's symptoms. No biliary ductal dilatation and no evidence for choledocholithiasis.      US Abdomen Limited (RUQ)   Final Result   IMPRESSION:   1.  Gallbladder sludge. No shadowing gallstones or other sonographic evidence of acute cholecystitis.                            DISCHARGE MEDS  Discharge Medication List as of 8/22/2024 12:04 PM        START taking these medications    Details   omeprazole (PRILOSEC) 40 MG DR capsule Take 1 capsule (40 mg) by mouth daily., Disp-30 capsule, R-0, E-Prescribe      ondansetron (ZOFRAN ODT) 4 MG ODT tab Take 1 tablet (4 mg) by mouth every 8 hours as needed., Disp-10 tablet, R-0, E-Prescribe             Asad Neely MD  [facility] EMERGENCY ROOM  [address] [place] [id number] [phone]      Asad Neely MD  08/22/24 1238       Asad Neely MD  08/23/24 4738

## 2024-08-22 NOTE — ED PROVIDER NOTES
EMERGENCY DEPARTMENT ENCOUNTER      NAME: Isaias Valencia Jr.  AGE: 38 year old male  YOB: 1985  MRN: [id number]  EVALUATION DATE & TIME: 8/22/2024  4:38 AM    PCP: No Ref-Primary, Physician    ED PROVIDER: González Mosqueda M.D.      Chief Complaint   Patient presents with    Abdominal Pain         FINAL IMPRESSION:  1. Epigastric pain    2. Elevated liver function tests          ED COURSE & MEDICAL DECISION MAKING:    Pertinent Labs & Imaging studies reviewed. (See chart for details)  38 year old male presents to the Emergency Department for evaluation of abdominal pain.  Pain is in the epigastrium.  Has had this similarly.  Was seen at [facility].  Did review that visit.  Had a CT of the chest abdomen pelvis that is negative.  He is on Zepbound so some concern for possible gastritis or pancreatitis.  Lipase is normal however LFTs and bilirubin are up from previous.  Given this did get an ultrasound that shows some sludge in the gallbladder but no other abnormalities.  No signs of cholecystitis no biliary duct dilatation.  No intrahepatic duct dilatation.  Patient is given Pepcid and Toradol with improvement of symptoms.  Also given fluids.  Given the elevated bilirubin did add on a hepatitis panel.  Will also get an MRCP.  Patient signed out to Dr. Schmidt pending MRCP.  Plan at time of signout is if negative and patient feeling better discharge home with GI follow-up.    4:39 AM I met with the patient to gather history and to perform my initial exam. I discussed the plan for care while in the Emergency Department.       At the conclusion of the encounter I discussed the results of all of the tests and the disposition. The questions were answered. The patient or family acknowledged understanding and was agreeable with the care plan.     Medical Decision Making  Obtained supplemental history:Supplemental history obtained?: No  Reviewed external records: External records reviewed?: Documented in  chart and Outpatient Record: [facility] ED on 07/31/2024  Care impacted by chronic illness:N/A  Care significantly affected by social determinants of health:Access to Medical Care  Did you consider but not order tests?: Work up considered but not performed and documented in chart, if applicable  Did you interpret images independently?: Independent interpretation of ECG and images noted in documentation, when applicable.  Consultation discussion with other provider:Did you involve another provider (consultant, , pharmacy, etc.)?: No  Admission considered. Patient was signed out to the oncoming physician, disposition pending.  No MIPS measures identified.           MEDICATIONS GIVEN IN THE EMERGENCY:  Medications   ondansetron (ZOFRAN) injection 4 mg (has no administration in time range)   sodium chloride 0.9% BOLUS 1,000 mL (0 mLs Intravenous Stopped 8/22/24 0627)   ketorolac (TORADOL) injection 15 mg (15 mg Intravenous $Given 8/22/24 0452)   famotidine (PEPCID) injection 20 mg (20 mg Intravenous $Given 8/22/24 0453)       NEW PRESCRIPTIONS STARTED AT TODAY'S ER VISIT  New Prescriptions    No medications on file          =================================================================    HPI    Patient information was obtained from: The patient    Use of : N/A       Isaias Valencia Jr. is a 38 year old male with a pertinent history of anxiety who presents to this ED for evaluation of abdominal pain.    The patient reports he has been experiencing epigastric pain for the past 3 weeks. He was seen at [facility] at that time and could not find a clear cause of his symptoms after his work-up. He woke up around 10 PM last night with severe epigastric pain that radiates to the mid-back and will come in waves. His pain worsens when he sits down and finds more relief when he stands up. He has been having normal bowel/bladder habits. He is nauseous but denies any vomiting episodes. He is  currently on Zepbound for weight loss since February and has lost approximately 100 lbs thus far. No complaints of any other associated symptoms at this time.    Per chart review, the patient was seen at [facility] ED on 07/31/2024 for an evaluation of acute back, chest, and abdominal pain. He was given analgesia although he would like to avaoid opiate analgesia. CT angio chest showed no acute process in chest, abdomen, or pelvis. He felt much better after receiving meds. He was discharged in stable condition with a prescription for omeprazole.    PAST MEDICAL HISTORY:  Past Medical History:   Diagnosis Date    Depressive disorder        PAST SURGICAL HISTORY:  No past surgical history on file.        CURRENT MEDICATIONS:    Current Facility-Administered Medications   Medication Dose Route Frequency Provider Last Rate Last Admin    ondansetron (ZOFRAN) injection 4 mg  4 mg Intravenous Q30 Min PRN González Mosqueda MD         Current Outpatient Medications   Medication Sig Dispense Refill    tirzepatide-Weight Management (ZEPBOUND) 15 MG/0.5ML prefilled pen Inject 0.5 mLs (15 mg) Subcutaneous every 7 days 6 mL 3         ALLERGIES:  No Known Allergies    FAMILY HISTORY:  Family History   Problem Relation Age of Onset    Depression Mother     Mental Illness Mother     Obesity Mother     Obesity Brother     Depression Paternal Grandfather     Diabetes No family hx of     Thyroid Cancer No family hx of        SOCIAL HISTORY:   Social History     Socioeconomic History    Marital status: Single   Tobacco Use    Smoking status: Former     Current packs/day: 0.00     Average packs/day: 0.5 packs/day for 7.0 years (3.5 ttl pk-yrs)     Types: Cigarettes, Dip, chew, snus or snuff     Start date: 12/29/2001     Quit date: 12/29/2008     Years since quitting: 15.6    Smokeless tobacco: Former     Quit date: 8/1/2023   Vaping Use    Vaping status: Never Used   Substance and Sexual Activity    Alcohol use: Never    Drug  "use: Yes     Types: Marijuana, Psilocybin     Comment: Thc edibles 1-2 times a week . Mushrooms 1-2 times a year    Sexual activity: Yes     Partners: Female     Birth control/protection: I.U.D.   Other Topics Concern    Parent/sibling w/ CABG, MI or angioplasty before 65F 55M? Yes   Social History Narrative    Lives with family      Social Determinants of Health     Financial Resource Strain: Low Risk  (2/1/2024)    Financial Resource Strain     Within the past 12 months, have you or your family members you live with been unable to get utilities (heat, electricity) when it was really needed?: No   Food Insecurity: Unknown (2/1/2024)    Food Insecurity     Within the past 12 months, did you worry that your food would run out before you got money to buy more?: Patient declined     Within the past 12 months, did the food you bought just not last and you didn t have money to get more?: Patient declined   Transportation Needs: Low Risk  (2/1/2024)    Transportation Needs     Within the past 12 months, has lack of transportation kept you from medical appointments, getting your medicines, non-medical meetings or appointments, work, or from getting things that you need?: No    Received from [facility], [facility]    Social Connections   Interpersonal Safety: Unknown (7/31/2024)    Received from [facility]    Humiliation, Afraid, Rape, and Kick questionnaire     Emotionally Abused: No     Physically Abused: No     Sexually Abused: No   Housing Stability: Low Risk  (2/1/2024)    Housing Stability     Do you have housing? : Yes     Are you worried about losing your housing?: No       VITALS:  /70   Pulse 65   Temp 98  F (36.7  C) (Temporal)   Resp 18   Ht 1.753 m (5' 9\")   Wt 99.8 kg (220 lb)   SpO2 96%   BMI 32.49 kg/m      PHYSICAL EXAM    Physical Exam  Vitals and nursing note reviewed.   Constitutional:       General: He is not in acute " distress.     Appearance: He is not diaphoretic.   HENT:      Head: Atraumatic.   Eyes:      General: No scleral icterus.     Pupils: Pupils are equal, round, and reactive to light.   Cardiovascular:      Rate and Rhythm: Normal rate and regular rhythm.      Heart sounds: Normal heart sounds.   Pulmonary:      Effort: No respiratory distress.      Breath sounds: Normal breath sounds.   Abdominal:      Palpations: Abdomen is soft.      Tenderness: There is abdominal tenderness in the epigastric area.   Musculoskeletal:         General: No tenderness.   Lymphadenopathy:      Cervical: No cervical adenopathy.   Skin:     General: Skin is warm.      Findings: No rash.           LAB:  All pertinent labs reviewed and interpreted.  Labs Ordered and Resulted from Time of ED Arrival to Time of ED Departure   COMPREHENSIVE METABOLIC PANEL - Abnormal       Result Value    Sodium 141      Potassium 3.7      Carbon Dioxide (CO2) 24      Anion Gap 14      Urea Nitrogen 19.1      Creatinine 1.16      GFR Estimate 82      Calcium 9.4      Chloride 103      Glucose 129 (*)     Alkaline Phosphatase 149       (*)      (*)     Protein Total 7.5      Albumin 4.2      Bilirubin Total 1.9 (*)    LIPASE - Normal    Lipase 47     CBC WITH PLATELETS AND DIFFERENTIAL    WBC Count 8.5      RBC Count 5.48      Hemoglobin 16.8      Hematocrit 48.0      MCV 88      MCH 30.7      MCHC 35.0      RDW 13.1      Platelet Count 308      % Neutrophils 74      % Lymphocytes 18      % Monocytes 7      % Eosinophils 1      % Basophils 0      % Immature Granulocytes 0      NRBCs per 100 WBC 0      Absolute Neutrophils 6.3      Absolute Lymphocytes 1.5      Absolute Monocytes 0.6      Absolute Eosinophils 0.1      Absolute Basophils 0.0      Absolute Immature Granulocytes 0.0      Absolute NRBCs 0.0     ACUTE HEPATITIS PANEL       RADIOLOGY:  Reviewed all pertinent imaging. Please see official radiology report.  US Abdomen Limited (RUQ)   Final  Result   IMPRESSION:   1.  Gallbladder sludge. No shadowing gallstones or other sonographic evidence of acute cholecystitis.                        MR Abdomen MRCP w/o & w Contrast    (Results Pending)           I, Kevin Laguna, am serving as a scribe to document services personally performed by Dr. González Mosqueda, based on my observation and the provider's statements to me. I, González Mosqueda MD attest that Kevin Laguna is acting in a scribe capacity, has observed my performance of the services and has documented them in accordance with my direction.    González Mosqueda M.D.  Emergency Medicine  [facility] EMERGENCY ROOM  [address] [facility] [id number] [phone]  Dept: [phone]       González Mosqueda MD  08/22/24 0652

## 2024-08-23 ENCOUNTER — DOCUMENTATION ONLY (OUTPATIENT)
Dept: EMERGENCY MEDICINE | Facility: CLINIC | Age: 39
End: 2024-08-23
Payer: COMMERCIAL

## 2024-08-23 NOTE — PROGRESS NOTES
Isaias Valencia Jr. has an upcoming lab appointment:    Future Appointments   Date Time Provider Department Center   8/26/2024  8:30 AM [facility] LAB [facility]   12/5/2024  7:00 AM Mateus Llamas MD SW[facility]     Per patient appointment notes, recheck AST and ALT.    There is no order available. Please review and place either future orders.    Brandy Doherty

## 2024-08-28 ENCOUNTER — OFFICE VISIT (OUTPATIENT)
Dept: FAMILY MEDICINE | Facility: CLINIC | Age: 39
End: 2024-08-28
Payer: COMMERCIAL

## 2024-08-28 VITALS
HEIGHT: 69 IN | WEIGHT: 221.5 LBS | RESPIRATION RATE: 16 BRPM | DIASTOLIC BLOOD PRESSURE: 77 MMHG | OXYGEN SATURATION: 98 % | SYSTOLIC BLOOD PRESSURE: 116 MMHG | HEART RATE: 50 BPM | BODY MASS INDEX: 32.81 KG/M2 | TEMPERATURE: 98.1 F

## 2024-08-28 DIAGNOSIS — E66.811 CLASS 1 OBESITY DUE TO EXCESS CALORIES WITHOUT SERIOUS COMORBIDITY WITH BODY MASS INDEX (BMI) OF 32.0 TO 32.9 IN ADULT: ICD-10-CM

## 2024-08-28 DIAGNOSIS — R10.13 EPIGASTRIC PAIN: ICD-10-CM

## 2024-08-28 DIAGNOSIS — E66.09 CLASS 1 OBESITY DUE TO EXCESS CALORIES WITHOUT SERIOUS COMORBIDITY WITH BODY MASS INDEX (BMI) OF 32.0 TO 32.9 IN ADULT: ICD-10-CM

## 2024-08-28 DIAGNOSIS — R74.01 ELEVATED TRANSAMINASE LEVEL: Primary | ICD-10-CM

## 2024-08-28 DIAGNOSIS — E78.5 DYSLIPIDEMIA: ICD-10-CM

## 2024-08-28 DIAGNOSIS — R79.89 ELEVATED LIVER FUNCTION TESTS: ICD-10-CM

## 2024-08-28 LAB
ALBUMIN SERPL BCG-MCNC: 4.3 G/DL (ref 3.5–5.2)
ALP SERPL-CCNC: 152 U/L (ref 40–150)
ALT SERPL W P-5'-P-CCNC: 181 U/L (ref 0–70)
ANION GAP SERPL CALCULATED.3IONS-SCNC: 10 MMOL/L (ref 7–15)
AST SERPL W P-5'-P-CCNC: 48 U/L (ref 0–45)
BILIRUB DIRECT SERPL-MCNC: 0.23 MG/DL (ref 0–0.3)
BILIRUB SERPL-MCNC: 0.8 MG/DL
BUN SERPL-MCNC: 17.4 MG/DL (ref 6–20)
CALCIUM SERPL-MCNC: 9.5 MG/DL (ref 8.8–10.4)
CHLORIDE SERPL-SCNC: 105 MMOL/L (ref 98–107)
CHOLEST SERPL-MCNC: 187 MG/DL
CREAT SERPL-MCNC: 1.13 MG/DL (ref 0.67–1.17)
EGFRCR SERPLBLD CKD-EPI 2021: 84 ML/MIN/1.73M2
FASTING STATUS PATIENT QL REPORTED: ABNORMAL
FASTING STATUS PATIENT QL REPORTED: ABNORMAL
GLUCOSE SERPL-MCNC: 85 MG/DL (ref 70–99)
HCO3 SERPL-SCNC: 24 MMOL/L (ref 22–29)
HDLC SERPL-MCNC: 32 MG/DL
LDLC SERPL CALC-MCNC: 140 MG/DL
LIPASE SERPL-CCNC: 24 U/L (ref 13–60)
NONHDLC SERPL-MCNC: 155 MG/DL
POTASSIUM SERPL-SCNC: 4.5 MMOL/L (ref 3.4–5.3)
PROT SERPL-MCNC: 7.6 G/DL (ref 6.4–8.3)
SODIUM SERPL-SCNC: 139 MMOL/L (ref 135–145)
TRIGL SERPL-MCNC: 76 MG/DL

## 2024-08-28 PROCEDURE — G2211 COMPLEX E/M VISIT ADD ON: HCPCS | Performed by: FAMILY MEDICINE

## 2024-08-28 PROCEDURE — 83690 ASSAY OF LIPASE: CPT | Performed by: FAMILY MEDICINE

## 2024-08-28 PROCEDURE — 99214 OFFICE O/P EST MOD 30 MIN: CPT | Performed by: FAMILY MEDICINE

## 2024-08-28 PROCEDURE — 36415 COLL VENOUS BLD VENIPUNCTURE: CPT | Performed by: FAMILY MEDICINE

## 2024-08-28 PROCEDURE — 82248 BILIRUBIN DIRECT: CPT | Performed by: FAMILY MEDICINE

## 2024-08-28 PROCEDURE — 80053 COMPREHEN METABOLIC PANEL: CPT | Performed by: FAMILY MEDICINE

## 2024-08-28 PROCEDURE — 80061 LIPID PANEL: CPT | Performed by: FAMILY MEDICINE

## 2024-08-28 NOTE — PROGRESS NOTES
Assessment & Plan   Problem List Items Addressed This Visit       Class 1 obesity due to excess calories without serious comorbidity with body mass index (BMI) of 32.0 to 32.9 in adult     This individual has done extremely well with medical weight loss down 90 pounds.  He has had 2 episodes of severe epigastric pain.  This was reviewed as part of today's visit.  Lipase was taken and was not abnormal.  Imaging did not show pancreatitis.  We discussed that a possible cause could be gallbladder dysfunction which could be related to rapid weight loss over the past 12 months.  We will repeat laboratory testing given his transaminase elevations.  He had an MRCP that did not show choledocholithiasis.  He is scheduled to see GI next week.  For now, we will decrease his tirzepatide dose from 15 mg/week down to 10 mg/week.  Assuming he does well on this medicine we will advance to 12.5 mg/week dosing for 1 month before restoring his maintenance dose of 15 mg.  We briefly reviewed nutrition.  In general, he continues to eat well.  He has some recollection that he might of over eaten protein type foods prior to the episodes of abdominal discomfort.         Relevant Medications    tirzepatide-Weight Management (ZEPBOUND) 10 MG/0.5ML prefilled pen    tirzepatide-Weight Management (ZEPBOUND) 12.5 MG/0.5ML prefilled pen    Dyslipidemia    Relevant Medications    tirzepatide-Weight Management (ZEPBOUND) 10 MG/0.5ML prefilled pen    tirzepatide-Weight Management (ZEPBOUND) 12.5 MG/0.5ML prefilled pen    Other Relevant Orders    Lipid panel reflex to direct LDL Fasting    Elevated transaminase level - Primary    Relevant Orders    Comprehensive metabolic panel (BMP + Alb, Alk Phos, ALT, AST, Total. Bili, TP)     Other Visit Diagnoses       Epigastric pain        Relevant Orders    Comprehensive metabolic panel (BMP + Alb, Alk Phos, ALT, AST, Total. Bili, TP)    Elevated liver function tests        Relevant Orders    Bilirubin  "direct           MED REC REQUIRED  Post Medication Reconciliation Status:  Patient was not discharged from an inpatient facility or TCU    The longitudinal plan of care for the diagnosis(es)/condition(s) as documented were addressed during this visit. Due to the added complexity in care, I will continue to support Isaias in the subsequent management and with ongoing continuity of care.      Subjective   Isaias is a 38 year old, presenting for the following health issues:  ER F/U (ER follow-up from [facility] Room on 8/22/24 for Epigastric pain)        8/28/2024    10:34 AM   Additional Questions   Roomed by xl     Patient on zepbound.  \"Burps\" but otherwise no side effects with initial titration or currently.   Abdominal pain two episodes (July and 6 days ago).  \"Radiated to my whole back.\"  Pain now completely gone.   Transanimes are elevated. Imaging did not show pancreatitis and did not show gallbladder causes.    The patient has not taken zepbound.   Nutrition: episodes did not correspond to any new foods.  He does remember the first episode being after a \"bigger meal.\"    He has an appointment with [facility].  This is scheduled for next week.   No alcohol.            Objective    /77 (BP Location: Left arm, Patient Position: Sitting, Cuff Size: Adult Regular)   Pulse 50   Temp 98.1  F (36.7  C) (Oral)   Resp 16   Ht 1.753 m (5' 9\")   Wt 100.5 kg (221 lb 8 oz)   SpO2 98%   BMI 32.71 kg/m    Body mass index is 32.71 kg/m .  Physical Exam  Nursing note reviewed.   Constitutional:       General: He is not in acute distress.     Appearance: Normal appearance. He is not ill-appearing.   HENT:      Head: Normocephalic and atraumatic.      Right Ear: External ear normal.      Left Ear: External ear normal.   Eyes:      General: No scleral icterus.     Extraocular Movements: Extraocular movements intact.      Conjunctiva/sclera: Conjunctivae normal.   Cardiovascular:      " Rate and Rhythm: Normal rate and regular rhythm.      Heart sounds: Normal heart sounds. No murmur heard.     No friction rub. No gallop.   Pulmonary:      Effort: Pulmonary effort is normal. No respiratory distress.      Breath sounds: Normal breath sounds. No wheezing or rales.   Abdominal:      General: There is no distension.      Palpations: Abdomen is soft.      Tenderness: There is no abdominal tenderness.   Musculoskeletal:         General: No swelling. Normal range of motion.   Skin:     General: Skin is warm.      Coloration: Skin is not jaundiced.      Findings: No rash.   Neurological:      General: No focal deficit present.      Mental Status: He is alert and oriented to person, place, and time. Mental status is at baseline.   Psychiatric:         Attention and Perception: Attention normal.         Mood and Affect: Mood normal.         Speech: Speech normal.         Thought Content: Thought content normal.                  Signed Electronically by: Mateus Llamas MD

## 2024-08-28 NOTE — PROGRESS NOTES
"  {PROVIDER CHARTING PREFERENCE:935995}    Laron Esparza is a 38 year old, presenting for the following health issues:  ER F/U (ER follow-up from [facility] Emergency Room on 8/22/24 for Epigastric pain)      8/28/2024    10:34 AM   Additional Questions   Roomed by xl     HPI     {MA/LPN/RN Pre-Provider Visit Orders- hCG/UA/Strep (Optional):894538}  {SUPERLIST (Optional):609208}  {additonal problems for provider to add (Optional):599884}    {ROS Picklists (Optional):276854}      Objective    /77 (BP Location: Left arm, Patient Position: Sitting, Cuff Size: Adult Regular)   Pulse 50   Temp 98.1  F (36.7  C) (Oral)   Resp 16   Ht 1.753 m (5' 9\")   Wt 100.5 kg (221 lb 8 oz)   SpO2 98%   BMI 32.71 kg/m    Body mass index is 32.71 kg/m .  Physical Exam   {Exam List (Optional):689726}    {Diagnostic Test Results (Optional):321975}        Signed Electronically by: Mateus Llamas MD  {Email feedback regarding this note to [email or web address]   :202176}  "

## 2024-08-28 NOTE — ASSESSMENT & PLAN NOTE
This individual has done extremely well with medical weight loss down 90 pounds.  He has had 2 episodes of severe epigastric pain.  This was reviewed as part of today's visit.  Lipase was taken and was not abnormal.  Imaging did not show pancreatitis.  We discussed that a possible cause could be gallbladder dysfunction which could be related to rapid weight loss over the past 12 months.  We will repeat laboratory testing given his transaminase elevations.  He had an MRCP that did not show choledocholithiasis.  He is scheduled to see GI next week.  For now, we will decrease his tirzepatide dose from 15 mg/week down to 10 mg/week.  Assuming he does well on this medicine we will advance to 12.5 mg/week dosing for 1 month before restoring his maintenance dose of 15 mg.  We briefly reviewed nutrition.  In general, he continues to eat well.  He has some recollection that he might of over eaten protein type foods prior to the episodes of abdominal discomfort.

## 2024-10-14 ENCOUNTER — MYC REFILL (OUTPATIENT)
Dept: FAMILY MEDICINE | Facility: CLINIC | Age: 39
End: 2024-10-14
Payer: COMMERCIAL

## 2024-10-14 DIAGNOSIS — E66.09 CLASS 2 OBESITY DUE TO EXCESS CALORIES WITHOUT SERIOUS COMORBIDITY WITH BODY MASS INDEX (BMI) OF 36.0 TO 36.9 IN ADULT: ICD-10-CM

## 2024-10-14 DIAGNOSIS — E66.812 CLASS 2 OBESITY DUE TO EXCESS CALORIES WITHOUT SERIOUS COMORBIDITY WITH BODY MASS INDEX (BMI) OF 36.0 TO 36.9 IN ADULT: ICD-10-CM

## 2024-10-14 DIAGNOSIS — E66.09 CLASS 1 OBESITY DUE TO EXCESS CALORIES WITHOUT SERIOUS COMORBIDITY WITH BODY MASS INDEX (BMI) OF 32.0 TO 32.9 IN ADULT: ICD-10-CM

## 2024-10-14 DIAGNOSIS — R73.01 ELEVATED FASTING GLUCOSE: ICD-10-CM

## 2024-10-14 DIAGNOSIS — E66.811 CLASS 1 OBESITY DUE TO EXCESS CALORIES WITHOUT SERIOUS COMORBIDITY WITH BODY MASS INDEX (BMI) OF 32.0 TO 32.9 IN ADULT: ICD-10-CM

## 2024-10-14 DIAGNOSIS — E78.5 DYSLIPIDEMIA: ICD-10-CM

## 2024-10-15 ENCOUNTER — TELEPHONE (OUTPATIENT)
Dept: FAMILY MEDICINE | Facility: CLINIC | Age: 39
End: 2024-10-15

## 2024-10-15 NOTE — TELEPHONE ENCOUNTER
Prior Authorization Request  Who s requesting:  Provider  Pharmacy Name and Location: [facility]  Medication Name: tirzepatide-Weight Management (ZEPBOUND) 15 MG/0.5ML prefilled pen   Insurance Plan: [employer]  Insurance Member ID Number:  tirzepatide-Weight Management (ZEPBOUND) 15 MG/0.5ML prefilled pen   CoverMyMeds Key:   Informed patient that prior authorizations can take up to 10 business days for response:   Yes  Okay to leave a detailed message: Yes

## 2024-10-15 NOTE — TELEPHONE ENCOUNTER
Confirmed with [facility] rx on file and they will fill. Pharmacist did state that a PA is needed.

## 2024-10-15 NOTE — TELEPHONE ENCOUNTER
No PA on file since start of Zepbound. Spoke with patient to confirm, insurance has covered since 4/2024. Patient has had no notification of formulary change.     PA initiated in separate encounter.

## 2024-10-17 NOTE — TELEPHONE ENCOUNTER
PA Initiation    Medication: ZEPBOUND 15 MG/0.5ML SC SOAJ  Insurance Company: [employer] ([facility]) - Phone [phone] Fax [phone]  Pharmacy Filling the Rx: [facility] - [place], [place] - [address]  Filling Pharmacy Phone: [phone]  Filling Pharmacy Fax: [phone]  Start Date: 10/17/2024

## 2024-10-18 NOTE — TELEPHONE ENCOUNTER
PRIOR AUTHORIZATION DENIED    Medication: ZEPBOUND 15 MG/0.5ML SC SOAJ  Insurance Company: [employer] ([facility]) - Phone [phone] Fax [phone]  Denial Date: 10/17/2024  Denial Reason(s): Excluded  Appeal Information: Excluded  Patient Notified: No

## 2024-10-19 ENCOUNTER — MYC MEDICAL ADVICE (OUTPATIENT)
Dept: FAMILY MEDICINE | Facility: CLINIC | Age: 39
End: 2024-10-19
Payer: COMMERCIAL

## 2024-10-19 NOTE — CONFIDENTIAL NOTE
Initial approval was January 3 through September 29, 2024.  This was not a new medication.  Has been on Zepbound throughout 2024.  Did his insurance change?

## 2024-10-21 NOTE — TELEPHONE ENCOUNTER
Spoke with patient. He was able to clarify some details with his insurance plan:  - Patient's 1/3/24 PA approval was with previous insurance plan, [employer].  -Patient had insurance change to [employer] 4/1/24, At that time, Zepbound was covered on his formulary without PA requirement.   -Patient's [facility] insurance plan had 6-month renewal date of 10/1/24. At this time formulary plan was updated to exclude GLP medications.    Patient was told by plan that there may be an option for appeal. Decision letter did not offer any options. He will contact insurance plan to clarify.    Patient is interested in compounding options. We went over basic details of   Compounding Pharmacy for semaglutide and tirzeptide, including the impact of FDA shortage list decision on tirzepatide. Patient offered appointment to discuss with provider. He declined at this time but will call back to schedule if he does not get any favorable options from his insurance plan.

## 2024-11-04 ENCOUNTER — OFFICE VISIT (OUTPATIENT)
Dept: FAMILY MEDICINE | Facility: CLINIC | Age: 39
End: 2024-11-04
Payer: COMMERCIAL

## 2024-11-04 VITALS
TEMPERATURE: 98.6 F | SYSTOLIC BLOOD PRESSURE: 110 MMHG | DIASTOLIC BLOOD PRESSURE: 74 MMHG | WEIGHT: 216.8 LBS | OXYGEN SATURATION: 98 % | HEART RATE: 68 BPM | HEIGHT: 69 IN | RESPIRATION RATE: 16 BRPM | BODY MASS INDEX: 32.11 KG/M2

## 2024-11-04 DIAGNOSIS — E66.09 CLASS 1 OBESITY DUE TO EXCESS CALORIES WITHOUT SERIOUS COMORBIDITY WITH BODY MASS INDEX (BMI) OF 32.0 TO 32.9 IN ADULT: Primary | ICD-10-CM

## 2024-11-04 DIAGNOSIS — E66.811 CLASS 1 OBESITY DUE TO EXCESS CALORIES WITHOUT SERIOUS COMORBIDITY WITH BODY MASS INDEX (BMI) OF 32.0 TO 32.9 IN ADULT: Primary | ICD-10-CM

## 2024-11-04 DIAGNOSIS — E78.5 DYSLIPIDEMIA: ICD-10-CM

## 2024-11-04 DIAGNOSIS — R74.01 ELEVATED TRANSAMINASE LEVEL: ICD-10-CM

## 2024-11-04 PROCEDURE — 99214 OFFICE O/P EST MOD 30 MIN: CPT | Performed by: FAMILY MEDICINE

## 2024-11-04 PROCEDURE — G2211 COMPLEX E/M VISIT ADD ON: HCPCS | Performed by: FAMILY MEDICINE

## 2024-11-04 RX ORDER — TOPIRAMATE 25 MG/1
50 TABLET, FILM COATED ORAL
Qty: 60 TABLET | Refills: 1 | Status: SHIPPED | OUTPATIENT
Start: 2024-11-04

## 2024-11-04 RX ORDER — PHENTERMINE HYDROCHLORIDE 15 MG/1
15 CAPSULE ORAL EVERY MORNING
Qty: 30 CAPSULE | Refills: 0 | Status: SHIPPED | OUTPATIENT
Start: 2024-11-04

## 2024-11-04 NOTE — PATIENT INSTRUCTIONS
Cash Pay Options for continuing GLP1/GIP agonist in 2025:  Pay out of pocket for Wegovy or Zepbound pens (>$1000/month cash price without savings card)   Zepbound cost with Zepbound savings card (link below to sign up for this): $650/month with card  https://[email or web address].zepbound.[employer].com/enroll/checkEnrollment  Wegovy cost with Wegovy savings card (link below to sign up for this): $650/month with card   https://www.[employer]/coverage-and-savings/save-on-wegovy.html  Compounded semaglutide (same active ingredient as Wegovy) from [facility] ($230/month for doses of 1mg or less; $370/month for doses higher than 1mg)  This is an available option for as long as Wegovy is on FDA shortage list, Wegovy has been on the list for the last several months with no foreseeable end in sight as of now*  Zepbound Vials through [facility]ASH [facility] pharmacy - vials only available in 2.5 mg and 5 mg doses  $399/month for 2.5mg vials  $549/month for 5mg vials   $5 per month for administrations supplies (syringe/needles, etc)

## 2024-11-04 NOTE — ASSESSMENT & PLAN NOTE
Insurance change.  No longer getrs coverage from zepbound.  Planning to fund his HSA more aggressively.  Discussed that semaglutide through compounding pharmacy might be the least expensive option.  He is down an amazing amount of weight.  Active with walking (10-15k steps/day).     - consider compounding options   - trial of phentermine/topiramate.  Reviewed side effects.   - RTC three months?

## 2024-11-04 NOTE — PROGRESS NOTES
"  Assessment & Plan   Assessment & Plan  Class 1 obesity due to excess calories without serious comorbidity with body mass index (BMI) of 32.0 to 32.9 in adult  Insurance change.  No longer getrs coverage from zepbound.  Planning to fund his HSA more aggressively.  Discussed that semaglutide through compounding pharmacy might be the least expensive option.  He is down an amazing amount of weight.  Active with walking (10-15k steps/day).     - consider compounding options   - trial of phentermine/topiramate.  Reviewed side effects.   - RTC three months?    Dyslipidemia    Elevated transaminase level        BMI  Estimated body mass index is 32.02 kg/m  as calculated from the following:    Height as of this encounter: 1.753 m (5' 9\").    Weight as of this encounter: 98.3 kg (216 lb 12.8 oz).   Weight management plan: Specific weight management program called [facility] discussed    The longitudinal plan of care for the diagnosis(es)/condition(s) as documented were addressed during this visit. Due to the added complexity in care, I will continue to support Isaias in the subsequent management and with ongoing continuity of care.    Subjective   Isaias is a 38 year old, presenting for the following health issues:  Weight Loss (Follow-up )        8/28/2024    10:34 AM   Additional Questions   Roomed by xl     Patient presents for treatment of chronic, comorbid conditions using intensive therapeutic lifestyle interventions including nutrition, physical activity, and behavior management.   - insurance change: no longer covered.  Last dose about a month ago.  He has been able to continue to avoid fast food.  He notices more hunger.  He is tracking calories.  Struggling.  Carbs are limited.  He continues to optimize to protein.   - s/p lap lee about a month ago.  He is able to tolerate fat containing foods.     - frustrated.      History of Present Illness       Reason for visit:  Weight loss    He eats " "2-3 servings of fruits and vegetables daily.He consumes 1 sweetened beverage(s) daily.He exercises with enough effort to increase his heart rate 30 to 60 minutes per day.  He exercises with enough effort to increase his heart rate 5 days per week.   He is taking medications regularly.          Objective    /74 (BP Location: Left arm, Patient Position: Sitting, Cuff Size: Adult Large)   Pulse 68   Temp 98.6  F (37  C) (Oral)   Resp 16   Ht 1.753 m (5' 9\")   Wt 98.3 kg (216 lb 12.8 oz)   SpO2 98%   BMI 32.02 kg/m    Body mass index is 32.02 kg/m .  Physical Exam  Nursing note reviewed.   Constitutional:       General: He is not in acute distress.     Appearance: Normal appearance. He is not ill-appearing.   HENT:      Head: Normocephalic and atraumatic.   Eyes:      Extraocular Movements: Extraocular movements intact.      Conjunctiva/sclera: Conjunctivae normal.   Pulmonary:      Effort: Pulmonary effort is normal.   Neurological:      Mental Status: He is alert and oriented to person, place, and time.   Psychiatric:         Attention and Perception: Attention normal.         Mood and Affect: Mood normal.         Speech: Speech normal.         Thought Content: Thought content normal.             Signed Electronically by: Mateus Llamas MD    "

## 2024-12-05 ENCOUNTER — OFFICE VISIT (OUTPATIENT)
Dept: FAMILY MEDICINE | Facility: CLINIC | Age: 39
End: 2024-12-05
Attending: FAMILY MEDICINE
Payer: COMMERCIAL

## 2024-12-05 VITALS
TEMPERATURE: 97.6 F | BODY MASS INDEX: 31.67 KG/M2 | HEIGHT: 69 IN | RESPIRATION RATE: 16 BRPM | SYSTOLIC BLOOD PRESSURE: 104 MMHG | DIASTOLIC BLOOD PRESSURE: 69 MMHG | WEIGHT: 213.8 LBS | OXYGEN SATURATION: 99 % | HEART RATE: 71 BPM

## 2024-12-05 DIAGNOSIS — E66.09 CLASS 1 OBESITY DUE TO EXCESS CALORIES WITHOUT SERIOUS COMORBIDITY WITH BODY MASS INDEX (BMI) OF 31.0 TO 31.9 IN ADULT: Primary | ICD-10-CM

## 2024-12-05 DIAGNOSIS — E78.5 DYSLIPIDEMIA: ICD-10-CM

## 2024-12-05 DIAGNOSIS — E66.811 CLASS 1 OBESITY DUE TO EXCESS CALORIES WITHOUT SERIOUS COMORBIDITY WITH BODY MASS INDEX (BMI) OF 31.0 TO 31.9 IN ADULT: Primary | ICD-10-CM

## 2024-12-05 DIAGNOSIS — R74.01 ELEVATED TRANSAMINASE LEVEL: ICD-10-CM

## 2024-12-05 RX ORDER — TOPIRAMATE 25 MG/1
50 TABLET, FILM COATED ORAL
Qty: 180 TABLET | Refills: 1 | Status: SHIPPED | OUTPATIENT
Start: 2024-12-05

## 2024-12-05 RX ORDER — PHENTERMINE HYDROCHLORIDE 15 MG/1
15 CAPSULE ORAL EVERY MORNING
Qty: 90 CAPSULE | Refills: 0 | Status: SHIPPED | OUTPATIENT
Start: 2024-12-05

## 2024-12-05 NOTE — PROGRESS NOTES
"  Assessment & Plan   Problem List Items Addressed This Visit       Class 1 obesity due to excess calories without serious comorbidity with body mass index (BMI) of 31.0 to 31.9 in adult - Primary     38 year old year old male in clinic today to discuss treatment of the following conditions through diet and lifestyle modification and weight loss:  1. Class 1 obesity due to excess calories without serious comorbidity with body mass index (BMI) of 31.0 to 31.9 in adult    2. Dyslipidemia    3. Elevated transaminase level      The patient's weight loss result since the last visit was mixed based on maintenance of weight coming off zepbound.  No side effects from phentermine/topiramate.  Reviewed nutrition and exercise.  Will increase topiramate.  Reviewed nutrition. He is hitting protein goal. Increase veggies/fruit?  Doing well. Goal: functional goal?  Confidence.            Relevant Medications    topiramate (TOPAMAX) 25 MG tablet    phentermine 15 MG capsule    Dyslipidemia    Relevant Medications    topiramate (TOPAMAX) 25 MG tablet    phentermine 15 MG capsule    Elevated transaminase level    Relevant Medications    topiramate (TOPAMAX) 25 MG tablet    phentermine 15 MG capsule      The longitudinal plan of care for the diagnosis(es)/condition(s) as documented were addressed during this visit. Due to the added complexity in care, I will continue to support Isaias in the subsequent management and with ongoing continuity of care.    Laron Esparza is a 38 year old, presenting for the following health issues:  Follow Up (Wt loss)        12/5/2024     6:52 AM   Additional Questions   Roomed by Ariel Doherty MA   Accompanied by Self         12/5/2024     6:52 AM   Patient Reported Additional Medications   Patient reports taking the following new medications None     He is now off zepbound.  Exercising more.  \"Living at [employer]\" most days.  Varied exercise.  Phentermine helps during the day.  \"Wears off at " "night.\"  He has avoided fastfood.  \"Hungry at night.\"  He will have something with protein.  Sometimes will drink a bunch of water.  Energy okay.     - phentermine: no side effects.  He finds that it blunts appetite during the day.    - topiramate: no side effects.  He had grogginess initially.     - willian continues to get 100+ grams of protein per day        Objective    /69 (BP Location: Left arm, Patient Position: Sitting, Cuff Size: Adult Regular)   Pulse 71   Temp 97.6  F (36.4  C) (Oral)   Resp 16   Ht 1.753 m (5' 9\")   Wt 97 kg (213 lb 12.8 oz)   SpO2 99%   BMI 31.57 kg/m    Body mass index is 31.57 kg/m .  Physical Exam  Nursing note reviewed.   Constitutional:       General: He is not in acute distress.     Appearance: Normal appearance. He is not ill-appearing.   HENT:      Head: Normocephalic and atraumatic.   Eyes:      Extraocular Movements: Extraocular movements intact.      Conjunctiva/sclera: Conjunctivae normal.   Pulmonary:      Effort: Pulmonary effort is normal.   Neurological:      Mental Status: He is alert and oriented to person, place, and time.   Psychiatric:         Attention and Perception: Attention normal.         Mood and Affect: Mood normal.         Speech: Speech normal.         Thought Content: Thought content normal.                  Signed Electronically by: Mateus Llamas MD    "

## 2024-12-05 NOTE — ASSESSMENT & PLAN NOTE
38 year old year old male in clinic today to discuss treatment of the following conditions through diet and lifestyle modification and weight loss:  1. Class 1 obesity due to excess calories without serious comorbidity with body mass index (BMI) of 31.0 to 31.9 in adult    2. Dyslipidemia    3. Elevated transaminase level      The patient's weight loss result since the last visit was mixed based on maintenance of weight coming off zepbound.  No side effects from phentermine/topiramate.  Reviewed nutrition and exercise.  Will increase topiramate.  Reviewed nutrition. He is hitting protein goal. Increase veggies/fruit?  Doing well. Goal: functional goal?  Confidence.

## 2025-03-15 ENCOUNTER — HEALTH MAINTENANCE LETTER (OUTPATIENT)
Age: 40
End: 2025-03-15

## 2025-06-07 ENCOUNTER — MYC REFILL (OUTPATIENT)
Dept: FAMILY MEDICINE | Facility: CLINIC | Age: 40
End: 2025-06-07
Payer: COMMERCIAL

## 2025-06-07 DIAGNOSIS — E66.09 CLASS 1 OBESITY DUE TO EXCESS CALORIES WITHOUT SERIOUS COMORBIDITY WITH BODY MASS INDEX (BMI) OF 31.0 TO 31.9 IN ADULT: ICD-10-CM

## 2025-06-07 DIAGNOSIS — E66.811 CLASS 1 OBESITY DUE TO EXCESS CALORIES WITHOUT SERIOUS COMORBIDITY WITH BODY MASS INDEX (BMI) OF 31.0 TO 31.9 IN ADULT: ICD-10-CM

## 2025-06-07 DIAGNOSIS — R74.01 ELEVATED TRANSAMINASE LEVEL: ICD-10-CM

## 2025-06-07 DIAGNOSIS — E78.5 DYSLIPIDEMIA: ICD-10-CM

## 2025-06-09 RX ORDER — PHENTERMINE HYDROCHLORIDE 15 MG/1
15 CAPSULE ORAL EVERY MORNING
Qty: 90 CAPSULE | Refills: 0 | Status: SHIPPED | OUTPATIENT
Start: 2025-06-09

## 2025-08-11 ENCOUNTER — PATIENT OUTREACH (OUTPATIENT)
Dept: CARE COORDINATION | Facility: CLINIC | Age: 40
End: 2025-08-11
Payer: COMMERCIAL